# Patient Record
Sex: MALE | Race: WHITE | NOT HISPANIC OR LATINO | Employment: OTHER | URBAN - METROPOLITAN AREA
[De-identification: names, ages, dates, MRNs, and addresses within clinical notes are randomized per-mention and may not be internally consistent; named-entity substitution may affect disease eponyms.]

---

## 2020-11-27 ENCOUNTER — NURSE TRIAGE (OUTPATIENT)
Dept: HEALTH INFORMATION MANAGEMENT | Facility: OTHER | Age: 75
End: 2020-11-27

## 2020-11-27 NOTE — TELEPHONE ENCOUNTER
Pt & wife + for covid, extremely sick, sat level @ 93, nauseated.  What to do about nausea, cannot eat & drink because of nausea, he can eat bits of toast, he can sip water, lips feels dry, able to urinate but smaller amount.  Takes blood thinner for blood clots in legs that went to lungs.  Has not been throwing up.  Nauseated & gagging.

## 2021-06-14 ENCOUNTER — NURSE TRIAGE (OUTPATIENT)
Dept: HEALTH INFORMATION MANAGEMENT | Facility: OTHER | Age: 76
End: 2021-06-14

## 2021-06-14 NOTE — TELEPHONE ENCOUNTER
"Pt c/o runny nose, itchy watery eyes that started today.  Denies fever at this time.     Hx of allergies    Currently taking mucinex for severe cold w/some relief.    Advise home care at this time, call w/any new/worsen symptoms.    Reason for Disposition  • [1] Nasal allergies AND [2] only certain times of year AND [3] hay fever diagnosis has never been confirmed by a HCP    Additional Information  • Negative: Severe difficulty breathing (e.g., struggling for each breath, speaks in single words)  • Negative: Sounds like a life-threatening emergency to the triager  • Runny nose is caused by pollen or other allergies  • Negative: [1] Wheezing (high pitched whistling sound) AND [2] previous asthma attacks or use of asthma medicines  • Negative: [1] Wheezing (high pitched whistling sound) AND [2] no history of asthma  • Negative: Eye redness and itching are the only symptoms  • Negative: Doesn't match the SYMPTOMS for Hay Fever  • Negative: Patient sounds very sick or weak to the triager  • Negative: Lots of coughing  • Negative: [1] Lots of yellow or green discharge from nose AND [2] present > 3 days  • Negative: [1] Taking antihistamines > 2 days AND [2] nasal allergy symptoms interfere with sleep, school, or work    Answer Assessment - Initial Assessment Questions  1. ONSET: \"When did the nasal discharge start?\"       Today  2. AMOUNT: \"How much discharge is there?\"       Alot  3. COUGH: \"Do you have a cough?\" If yes, ask: \"Describe the color of your sputum\" (clear, white, yellow, green)      No, clear  4. RESPIRATORY DISTRESS: \"Describe your breathing.\"       Okay  5. FEVER: \"Do you have a fever?\" If so, ask: \"What is your temperature, how was it measured, and when did it start?\"      No  6. SEVERITY: \"Overall, how bad are you feeling right now?\" (e.g., doesn't interfere with normal activities, staying home from school/work, staying in bed)       Feel fine  7. OTHER SYMPTOMS: \"Do you have any other symptoms?\" " "(e.g., sore throat, earache, wheezing, vomiting)      No  8. PREGNANCY: \"Is there any chance you are pregnant?\" \"When was your last menstrual period?\"      n/a    Protocols used: NASAL ALLERGIES (HAY FEVER)-A-AH, COMMON COLD-A-AH      "

## 2022-11-07 PROBLEM — M48.07 SPINAL STENOSIS OF LUMBOSACRAL REGION: Status: ACTIVE | Noted: 2020-12-17

## 2022-11-07 PROBLEM — I49.3 PVC (PREMATURE VENTRICULAR CONTRACTION): Status: ACTIVE | Noted: 2020-12-17

## 2022-11-07 PROBLEM — I50.32 CHRONIC DIASTOLIC HEART FAILURE (HCC): Status: ACTIVE | Noted: 2020-12-17

## 2022-11-07 PROBLEM — E79.0 HYPERURICEMIA: Status: ACTIVE | Noted: 2020-12-17

## 2022-11-07 PROBLEM — R73.02 IGT (IMPAIRED GLUCOSE TOLERANCE): Status: ACTIVE | Noted: 2022-11-07

## 2022-11-07 PROBLEM — I10 ESSENTIAL HYPERTENSION: Status: ACTIVE | Noted: 2020-12-17

## 2022-11-07 PROBLEM — I65.23 OCCLUSION AND STENOSIS OF BILATERAL CAROTID ARTERIES: Status: ACTIVE | Noted: 2020-12-17

## 2022-11-07 PROBLEM — M51.16 INTERVERTEBRAL DISC DISORDER WITH RADICULOPATHY OF LUMBAR REGION: Status: ACTIVE | Noted: 2020-12-17
